# Patient Record
Sex: MALE | Race: AMERICAN INDIAN OR ALASKA NATIVE
[De-identification: names, ages, dates, MRNs, and addresses within clinical notes are randomized per-mention and may not be internally consistent; named-entity substitution may affect disease eponyms.]

---

## 2017-08-12 ENCOUNTER — HOSPITAL ENCOUNTER (EMERGENCY)
Dept: HOSPITAL 5 - ED | Age: 57
Discharge: HOME | End: 2017-08-12
Payer: COMMERCIAL

## 2017-08-12 VITALS — DIASTOLIC BLOOD PRESSURE: 72 MMHG | SYSTOLIC BLOOD PRESSURE: 152 MMHG
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
## 2017-08-12 DIAGNOSIS — F17.200: ICD-10-CM

## 2017-08-12 DIAGNOSIS — T78.3XXA: Primary | ICD-10-CM

## 2017-08-12 PROCEDURE — 96374 THER/PROPH/DIAG INJ IV PUSH: CPT

## 2017-08-12 PROCEDURE — 87116 MYCOBACTERIA CULTURE: CPT

## 2017-08-12 PROCEDURE — 99282 EMERGENCY DEPT VISIT SF MDM: CPT

## 2017-08-12 PROCEDURE — 96375 TX/PRO/DX INJ NEW DRUG ADDON: CPT

## 2017-08-12 PROCEDURE — 87430 STREP A AG IA: CPT

## 2017-08-12 PROCEDURE — 96361 HYDRATE IV INFUSION ADD-ON: CPT

## 2017-08-12 NOTE — EMERGENCY DEPARTMENT REPORT
<RAMYAKIERSTEN WALTON M - Last Filed: 17 10:23>





ED ENT HPI





- General


Chief complaint: Sore Throat


Stated complaint: SORE THROAT


Time Seen by Provider: 17 08:39


Source: patient


Mode of arrival: Ambulatory


Limitations: No Limitations





- History of Present Illness


Initial comments: 





PT c/o sore throat x 4 days.  PT states he is having a difficult time 

swallowing.  PT states he has not been around anyone sick.  PT denies fevers.  





PT has a hx of htn and is currently being treated with lisinopril and 

verapamil.  last lisinopril yesterday at 0900


MD complaint: sore throat, difficulty swallowing


Onset/Timin


-: Gradual


Location: throat


Severity scale (0 -10): 9


Quality: constant


Consistency: constant


Improves with: none


Worsens with: swallowing, eating, other (talking )


Associated Symptoms: denies: fever





- Related Data


 Previous Rx's











 Medication  Instructions  Recorded  Last Taken  Type


 


Famotidine [Pepcid] 20 mg PO BID #8 tablet 17 Unknown Rx


 


predniSONE [Deltasone] 20 mg PO BID #10 tablet 17 Unknown Rx











 Allergies











Allergy/AdvReac Type Severity Reaction Status Date / Time


 


No Known Allergies Allergy   Verified 17 05:07














ED Dental HPI





- General


Chief complaint: Sore Throat


Stated complaint: SORE THROAT


Time Seen by Provider: 17 08:39


Source: patient


Mode of arrival: Ambulatory


Limitations: No Limitations





- Related Data


 Previous Rx's











 Medication  Instructions  Recorded  Last Taken  Type


 


Famotidine [Pepcid] 20 mg PO BID #8 tablet 17 Unknown Rx


 


predniSONE [Deltasone] 20 mg PO BID #10 tablet 17 Unknown Rx











 Allergies











Allergy/AdvReac Type Severity Reaction Status Date / Time


 


No Known Allergies Allergy   Verified 17 05:07














ED Review of Systems


ROS: 


Stated complaint: SORE THROAT


Other details as noted in HPI





Comment: All other systems reviewed and negative


Constitutional: denies: chills, fever, malaise


ENT: throat pain.  denies: congestion


Respiratory: denies: cough, shortness of breath, SOB with exertion, SOB at rest


Cardiovascular: other (pt states his blood pressure is usually well controlled )

.  denies: chest pain


Musculoskeletal: denies: myalgia





ED Past Medical Hx





- Past Medical History


Previous Medical History?: Yes


Hx Hypertension: Yes





- Surgical History


Past Surgical History?: No





- Social History


Smoking Status: Current Every Day Smoker


Substance Use Type: Alcohol





- Medications


Home Medications: 


 Home Medications











 Medication  Instructions  Recorded  Confirmed  Last Taken  Type


 


Famotidine [Pepcid] 20 mg PO BID #8 tablet 17  Unknown Rx


 


predniSONE [Deltasone] 20 mg PO BID #10 tablet 17  Unknown Rx














ED Physical Exam





- General


Limitations: No Limitations


General appearance: alert, in no apparent distress





- Head


Head exam: Present: atraumatic, normocephalic, normal inspection





- Eye


Eye exam: Present: normal appearance, PERRL, EOMI.  Absent: conjunctival 

injection





- ENT


ENT exam: Present: mucous membranes moist, TM's normal bilaterally, normal 

external ear exam





- Expanded ENT Exam


  ** Expanded


Mouth exam: Present: muffled voice.  Absent: drooling, trismus


Throat exam: Positive: tonsillomegaly, other (uvular edema- no airway 

obstruction noted ).  Negative: tonsillar exudate, R peritonsillar mass, L 

peritonsillar mass





- Neck


Neck exam: Present: normal inspection, full ROM.  Absent: tenderness, 

lymphadenopathy





- Respiratory


Respiratory exam: Present: normal lung sounds bilaterally.  Absent: respiratory 

distress, chest wall tenderness





- Cardiovascular


Cardiovascular Exam: Present: regular rate, normal rhythm, normal heart sounds





- GI/Abdominal


GI/Abdominal exam: Present: soft.  Absent: tenderness





- Extremities Exam


Extremities exam: Present: normal inspection, full ROM





- Back Exam


Back exam: Present: normal inspection, full ROM.  Absent: tenderness





- Neurological Exam


Neurological exam: Present: alert, oriented X3





- Psychiatric


Psychiatric exam: Present: normal affect, normal mood





- Skin


Skin exam: Present: warm, dry, intact, normal color.  Absent: rash





ED Course


 Vital Signs











  17





  05:07 11:49


 


Temperature 98.6 F 


 


Pulse Rate 93 H 74


 


Respiratory 18 20





Rate  


 


Blood Pressure 150/107 


 


Blood Pressure  152/72





[Right]  


 


O2 Sat by Pulse 97 99





Oximetry  














- Reevaluation(s)


Reevaluation #1: 





17 09:07


PT also seen by Dr Do. 


Reevaluation #2: 





17 10:23


pt laying on stretcher, appears to be sleeping, no acute resp distress noted.  

will continue to monitor. 





- Pulse Oximetry Interpretation


  ** Digit-Finger


Initial Pulse Oximetry Readin


Actions Taken: none





ED Medical Decision Making





- Differential Diagnosis


strep throat, viral pharyngitis, angioedema 


Critical care attestation.: 


If time is entered above; I have spent that time in minutes in the direct care 

of this critically ill patient, excluding procedure time.








ED Disposition


Clinical Impression: 


 Angioedema





Disposition: DC-01 TO HOME OR SELFCARE


Condition: Stable


Instructions:  Angioedema (ED)


Additional Instructions: 


Please stop your lisinopril.  Follow-up with your primary care doctor to switch 

her blood pressure medication.


Prescriptions: 


Famotidine [Pepcid] 20 mg PO BID #8 tablet


predniSONE [Deltasone] 20 mg PO BID #10 tablet


Referrals: 


PRIMARY CARE,MD [Primary Care Provider] - 3-5 Days





<EDYTA DO - Last Filed: 17 12:41>





ED Medical Decision Making





- Medical Decision Making





I have seen and examined this patient myself.  I agree with the PA or NP plan 

as discussed.  Patient is a 57-year-old on lisinopril here with increasing 

throat pain and difficult swallowing.  He has no angioedema of the lips however 

his uvula is swollen.  He is no reason for infection rapid strep is negative I 

suspect this is likely due to lisinopril.  Plan to treat as angioedema we'll 

observe for several hours and if no progression discharge home.


Shahbaz Do





ED Disposition


Is pt being admited?: No

## 2023-11-17 ENCOUNTER — OFFICE VISIT (OUTPATIENT)
Dept: URBAN - METROPOLITAN AREA CLINIC 109 | Facility: CLINIC | Age: 63
End: 2023-11-17

## 2023-11-21 ENCOUNTER — TELEPHONE ENCOUNTER (OUTPATIENT)
Dept: URBAN - METROPOLITAN AREA CLINIC 109 | Facility: CLINIC | Age: 63
End: 2023-11-21

## 2024-08-28 ENCOUNTER — OFFICE VISIT (OUTPATIENT)
Dept: URBAN - METROPOLITAN AREA CLINIC 109 | Facility: CLINIC | Age: 64
End: 2024-08-28
Payer: MEDICARE

## 2024-08-28 ENCOUNTER — DASHBOARD ENCOUNTERS (OUTPATIENT)
Age: 64
End: 2024-08-28

## 2024-08-28 ENCOUNTER — LAB OUTSIDE AN ENCOUNTER (OUTPATIENT)
Dept: URBAN - METROPOLITAN AREA CLINIC 109 | Facility: CLINIC | Age: 64
End: 2024-08-28

## 2024-08-28 VITALS
BODY MASS INDEX: 28 KG/M2 | SYSTOLIC BLOOD PRESSURE: 158 MMHG | HEIGHT: 74 IN | HEART RATE: 90 BPM | TEMPERATURE: 97.2 F | DIASTOLIC BLOOD PRESSURE: 90 MMHG | WEIGHT: 218.2 LBS

## 2024-08-28 DIAGNOSIS — K92.1 HEMATOCHEZIA: ICD-10-CM

## 2024-08-28 DIAGNOSIS — R19.4 CHANGE IN BOWEL HABITS: ICD-10-CM

## 2024-08-28 DIAGNOSIS — Z12.11 SCREENING FOR COLORECTAL CANCER: ICD-10-CM

## 2024-08-28 PROBLEM — 88111009: Status: ACTIVE | Noted: 2024-08-28

## 2024-08-28 PROCEDURE — 99204 OFFICE O/P NEW MOD 45 MIN: CPT | Performed by: INTERNAL MEDICINE

## 2024-08-28 PROCEDURE — 99244 OFF/OP CNSLTJ NEW/EST MOD 40: CPT | Performed by: INTERNAL MEDICINE

## 2024-08-28 NOTE — HPI-TODAY'S VISIT:
The patient presents as referral from Dr Duncan Vazquez for evaluation of hematochezia.  A copy will be sent to the referring provider.  Patient reports having self-limited hematochezia a couple weeks ago.  this occurred in the setting of constipation/straining.  No episodes since then.  Pt believes constipation was due to side effect of a medication he was on for his prostate.  he has been less constipated since stopping medication.  last colonoscopy over 10 years ago and without polyps from pt's understanding.  denies abd pain, not on plavix/AC.

## 2024-09-05 ENCOUNTER — OFFICE VISIT (OUTPATIENT)
Dept: URBAN - METROPOLITAN AREA SURGERY CENTER 23 | Facility: SURGERY CENTER | Age: 64
End: 2024-09-05

## 2024-09-05 ENCOUNTER — CLAIMS CREATED FROM THE CLAIM WINDOW (OUTPATIENT)
Dept: URBAN - METROPOLITAN AREA SURGERY CENTER 23 | Facility: SURGERY CENTER | Age: 64
End: 2024-09-05
Payer: MEDICARE

## 2024-09-05 DIAGNOSIS — Z12.11 COLON CANCER SCREENING: ICD-10-CM

## 2024-09-05 DIAGNOSIS — K64.8 INTERNAL HEMORRHOIDS: ICD-10-CM

## 2024-09-05 DIAGNOSIS — K57.30 DIVERTICULA OF COLON: ICD-10-CM

## 2024-09-05 PROCEDURE — 00812 ANES LWR INTST SCR COLSC: CPT | Performed by: NURSE ANESTHETIST, CERTIFIED REGISTERED
